# Patient Record
Sex: FEMALE | Race: WHITE
[De-identification: names, ages, dates, MRNs, and addresses within clinical notes are randomized per-mention and may not be internally consistent; named-entity substitution may affect disease eponyms.]

---

## 2021-11-30 ENCOUNTER — HOSPITAL ENCOUNTER (INPATIENT)
Dept: HOSPITAL 12 - ER | Age: 86
LOS: 3 days | Discharge: HOME | DRG: 65 | End: 2021-12-03
Attending: INTERNAL MEDICINE | Admitting: INTERNAL MEDICINE
Payer: MEDICARE

## 2021-11-30 VITALS — BODY MASS INDEX: 24.19 KG/M2 | HEIGHT: 59 IN | WEIGHT: 120 LBS

## 2021-11-30 VITALS — SYSTOLIC BLOOD PRESSURE: 172 MMHG | DIASTOLIC BLOOD PRESSURE: 76 MMHG

## 2021-11-30 VITALS — SYSTOLIC BLOOD PRESSURE: 190 MMHG | DIASTOLIC BLOOD PRESSURE: 79 MMHG

## 2021-11-30 DIAGNOSIS — D68.59: ICD-10-CM

## 2021-11-30 DIAGNOSIS — I65.01: ICD-10-CM

## 2021-11-30 DIAGNOSIS — I63.9: Primary | ICD-10-CM

## 2021-11-30 DIAGNOSIS — Z20.822: ICD-10-CM

## 2021-11-30 DIAGNOSIS — F41.9: ICD-10-CM

## 2021-11-30 DIAGNOSIS — I10: ICD-10-CM

## 2021-11-30 DIAGNOSIS — E78.5: ICD-10-CM

## 2021-11-30 DIAGNOSIS — L30.9: ICD-10-CM

## 2021-11-30 DIAGNOSIS — E89.0: ICD-10-CM

## 2021-11-30 DIAGNOSIS — G81.91: ICD-10-CM

## 2021-11-30 DIAGNOSIS — D32.9: ICD-10-CM

## 2021-11-30 DIAGNOSIS — Z91.041: ICD-10-CM

## 2021-11-30 DIAGNOSIS — G45.9: ICD-10-CM

## 2021-11-30 DIAGNOSIS — E11.9: ICD-10-CM

## 2021-11-30 DIAGNOSIS — I70.0: ICD-10-CM

## 2021-11-30 LAB
ALP SERPL-CCNC: 83 U/L (ref 50–136)
ALT SERPL W/O P-5'-P-CCNC: 30 U/L (ref 14–59)
AST SERPL-CCNC: 23 U/L (ref 15–37)
BILIRUB SERPL-MCNC: 0.4 MG/DL (ref 0.2–1)
BUN SERPL-MCNC: 16 MG/DL (ref 7–18)
CHLORIDE SERPL-SCNC: 99 MMOL/L (ref 98–107)
CHOLEST SERPL-MCNC: 267 MG/DL (ref ?–200)
CO2 SERPL-SCNC: 31 MMOL/L (ref 21–32)
CREAT SERPL-MCNC: 1.1 MG/DL (ref 0.6–1.3)
GLUCOSE SERPL-MCNC: 155 MG/DL (ref 74–106)
HCT VFR BLD AUTO: 41.1 % (ref 31.2–41.9)
MCH RBC QN AUTO: 31.8 UUG (ref 24.7–32.8)
MCV RBC AUTO: 94 FL (ref 75.5–95.3)
PLATELET # BLD AUTO: 231 K/UL (ref 179–408)
POTASSIUM SERPL-SCNC: 3.9 MMOL/L (ref 3.5–5.1)
WS STN SPEC: 7.6 G/DL (ref 6.4–8.2)

## 2021-11-30 PROCEDURE — G0378 HOSPITAL OBSERVATION PER HR: HCPCS

## 2021-11-30 PROCEDURE — A4663 DIALYSIS BLOOD PRESSURE CUFF: HCPCS

## 2021-11-30 RX ADMIN — Medication SCH MG: at 23:32

## 2021-11-30 RX ADMIN — DOCUSATE SODIUM SCH MG: 100 CAPSULE, LIQUID FILLED ORAL at 23:32

## 2021-11-30 RX ADMIN — ATORVASTATIN CALCIUM SCH MG: 40 TABLET, FILM COATED ORAL at 23:32

## 2021-11-30 NOTE — NUR
CONSENT FOR CT SIGNED BY PATIENT - PREMEDICATED WITH BENADRYL 25MG IV PRIOR TO 
PROCEDURE AS PER MD ORDER. TO RADIOLOGY VIA [place].

## 2021-12-01 VITALS — DIASTOLIC BLOOD PRESSURE: 48 MMHG | SYSTOLIC BLOOD PRESSURE: 104 MMHG

## 2021-12-01 VITALS — SYSTOLIC BLOOD PRESSURE: 131 MMHG | DIASTOLIC BLOOD PRESSURE: 50 MMHG

## 2021-12-01 VITALS — SYSTOLIC BLOOD PRESSURE: 97 MMHG | DIASTOLIC BLOOD PRESSURE: 46 MMHG

## 2021-12-01 VITALS — SYSTOLIC BLOOD PRESSURE: 169 MMHG | DIASTOLIC BLOOD PRESSURE: 77 MMHG

## 2021-12-01 VITALS — SYSTOLIC BLOOD PRESSURE: 152 MMHG | DIASTOLIC BLOOD PRESSURE: 65 MMHG

## 2021-12-01 VITALS — SYSTOLIC BLOOD PRESSURE: 121 MMHG | DIASTOLIC BLOOD PRESSURE: 43 MMHG

## 2021-12-01 LAB
ALP SERPL-CCNC: 76 U/L (ref 50–136)
ALT SERPL W/O P-5'-P-CCNC: 30 U/L (ref 14–59)
AST SERPL-CCNC: 22 U/L (ref 15–37)
BILIRUB SERPL-MCNC: 0.4 MG/DL (ref 0.2–1)
BUN SERPL-MCNC: 17 MG/DL (ref 7–18)
CHLORIDE SERPL-SCNC: 103 MMOL/L (ref 98–107)
CHOLEST SERPL-MCNC: 236 MG/DL (ref ?–200)
CO2 SERPL-SCNC: 31 MMOL/L (ref 21–32)
CREAT SERPL-MCNC: 0.9 MG/DL (ref 0.6–1.3)
GLUCOSE SERPL-MCNC: 101 MG/DL (ref 74–106)
HCT VFR BLD AUTO: 38 % (ref 31.2–41.9)
HDLC SERPL-MCNC: 95 MG/DL (ref 40–60)
MAGNESIUM SERPL-MCNC: 2.4 MG/DL (ref 1.8–2.4)
MCH RBC QN AUTO: 31.9 UUG (ref 24.7–32.8)
MCV RBC AUTO: 94.6 FL (ref 75.5–95.3)
PHOSPHATE SERPL-MCNC: 4 MG/DL (ref 2.5–4.9)
PLATELET # BLD AUTO: 221 K/UL (ref 179–408)
POTASSIUM SERPL-SCNC: 3.9 MMOL/L (ref 3.5–5.1)
TRIGL SERPL-MCNC: 88 MG/DL (ref 30–150)
TSH SERPL DL<=0.005 MIU/L-ACNC: 10.14 MIU/ML (ref 0.36–3.74)
WS STN SPEC: 6.5 G/DL (ref 6.4–8.2)

## 2021-12-01 RX ADMIN — ATORVASTATIN CALCIUM SCH MG: 40 TABLET, FILM COATED ORAL at 20:43

## 2021-12-01 RX ADMIN — DOCUSATE SODIUM SCH MG: 100 CAPSULE, LIQUID FILLED ORAL at 20:49

## 2021-12-01 RX ADMIN — ASPIRIN SCH MG: 81 TABLET, COATED ORAL at 08:48

## 2021-12-01 RX ADMIN — PANTOPRAZOLE SODIUM SCH MG: 40 TABLET, DELAYED RELEASE ORAL at 06:06

## 2021-12-01 RX ADMIN — LATANOPROST SCH ML: 50 SOLUTION OPHTHALMIC at 20:49

## 2021-12-01 RX ADMIN — Medication SCH MG: at 08:48

## 2021-12-01 RX ADMIN — Medication SCH EA: at 16:12

## 2021-12-01 NOTE — NUR
Pt refused her Colace. Pt wants to be left alone. Bed alarm on. Safety and comfort provided. 
Will continue to monitor.

## 2021-12-01 NOTE — NUR
received change of shift report from night shift. pt a/ox4, NSR on tele monitor, pt on room 
air, no signs of distress, saturating well, no reports of pain. pt ambulatory, BRP, IV on 
the right FA 20g. bed low and locked, call light within reach will continue with plan of 
care.

## 2021-12-01 NOTE — NUR
Received pt awake, alert and orientedx3. Pt in no acute distress. Iv intact. Pt can make her 
needs known. Safety and comfort provided. Will continue to monitor.

## 2021-12-02 VITALS — SYSTOLIC BLOOD PRESSURE: 128 MMHG | DIASTOLIC BLOOD PRESSURE: 40 MMHG

## 2021-12-02 VITALS — DIASTOLIC BLOOD PRESSURE: 54 MMHG | SYSTOLIC BLOOD PRESSURE: 124 MMHG

## 2021-12-02 VITALS — DIASTOLIC BLOOD PRESSURE: 57 MMHG | SYSTOLIC BLOOD PRESSURE: 135 MMHG

## 2021-12-02 VITALS — DIASTOLIC BLOOD PRESSURE: 58 MMHG | SYSTOLIC BLOOD PRESSURE: 139 MMHG

## 2021-12-02 VITALS — SYSTOLIC BLOOD PRESSURE: 151 MMHG | DIASTOLIC BLOOD PRESSURE: 58 MMHG

## 2021-12-02 RX ADMIN — LATANOPROST SCH DROP: 50 SOLUTION OPHTHALMIC at 20:45

## 2021-12-02 RX ADMIN — CLOPIDOGREL BISULFATE SCH MG: 75 TABLET ORAL at 10:25

## 2021-12-02 RX ADMIN — DOCUSATE SODIUM SCH MG: 100 CAPSULE, LIQUID FILLED ORAL at 20:44

## 2021-12-02 RX ADMIN — Medication SCH EA: at 10:25

## 2021-12-02 RX ADMIN — ASPIRIN SCH MG: 81 TABLET, COATED ORAL at 10:25

## 2021-12-02 RX ADMIN — PANTOPRAZOLE SODIUM SCH MG: 40 TABLET, DELAYED RELEASE ORAL at 06:26

## 2021-12-02 NOTE — NUR
Pt is a/o x 4, no signs of stroke during assessment. Pt is ambulatory, speaks clearly, able 
to tolerate meals and swallowing. No need to be kept NPO per radiology. Pt went for an MRI 
without contrast at 1045 via ambulance to [facility]. Pt returned from MRI via 
ambulance at 1150. Pt is stable on room air, presenting with sinus rhythm on tele.  No signs 
of acute distress or discomfort, will continue to monitor pt.

## 2021-12-02 NOTE — NUR
Pt slept comfortably.. Pt in no acute distress. Iv intact. Prescribed medication given and 
pt tolerated it well.  Safety and comfort provided. Pt stable. Pt on sinus bradycardia 
lowest 49. All needs are met. Will endorse to incoming nurse for continuity of care.

## 2021-12-03 VITALS — SYSTOLIC BLOOD PRESSURE: 132 MMHG | DIASTOLIC BLOOD PRESSURE: 52 MMHG

## 2021-12-03 VITALS — SYSTOLIC BLOOD PRESSURE: 134 MMHG | DIASTOLIC BLOOD PRESSURE: 80 MMHG

## 2021-12-03 VITALS — SYSTOLIC BLOOD PRESSURE: 146 MMHG | DIASTOLIC BLOOD PRESSURE: 65 MMHG

## 2021-12-03 RX ADMIN — ASPIRIN SCH MG: 81 TABLET, COATED ORAL at 09:47

## 2021-12-03 RX ADMIN — CLOPIDOGREL BISULFATE SCH MG: 75 TABLET ORAL at 09:47

## 2021-12-03 RX ADMIN — Medication SCH EA: at 09:46

## 2021-12-03 RX ADMIN — PANTOPRAZOLE SODIUM SCH MG: 40 TABLET, DELAYED RELEASE ORAL at 06:21

## 2021-12-03 NOTE — NUR
Pt is a/o x 4, tolerating meals well, no signs of stroke on stroke scale, no complaint of 
numbness or tingling of extremities. Pt participated with physical therapy today, ambulating 
steadily. D/C'd  telemetry per MD order. Comfort measures provided, call light within reach. 
Will continue to monitor pt.

## 2021-12-03 NOTE — NUR
Pt is discharged. Pt is ambulatory, left via private car with granddaughter Laura. Pt is a/o x 
4, no signs of acute distress. All discharge education provided, personal belongings signed 
for and home medications returned to pt.  provided number of outpatient 
neurologist to the granddaughter and set up appt for pt. MRI disc given to granddaughter 
with discharge paperwork.

## 2023-07-05 NOTE — NUR
Paperwork in RN office for review.   Patient discharged to home in stable condition.  Written and verbal after care 
instructions given. 

Patient verbalizes understanding of instructions. Stressed follow up or return 
to ER for worsening s/s.pt walks in steady gait, accompaqnied by grand 
daughter. pt says feels better.